# Patient Record
Sex: FEMALE | Race: WHITE | Employment: STUDENT | URBAN - METROPOLITAN AREA
[De-identification: names, ages, dates, MRNs, and addresses within clinical notes are randomized per-mention and may not be internally consistent; named-entity substitution may affect disease eponyms.]

---

## 2017-05-09 ENCOUNTER — OFFICE VISIT (OUTPATIENT)
Dept: FAMILY MEDICINE CLINIC | Age: 22
End: 2017-05-09

## 2017-05-09 VITALS
BODY MASS INDEX: 38.57 KG/M2 | SYSTOLIC BLOOD PRESSURE: 108 MMHG | HEART RATE: 72 BPM | TEMPERATURE: 98.1 F | DIASTOLIC BLOOD PRESSURE: 66 MMHG | RESPIRATION RATE: 16 BRPM | HEIGHT: 66 IN | WEIGHT: 240 LBS

## 2017-05-09 DIAGNOSIS — E16.1 HYPERINSULINEMIA: ICD-10-CM

## 2017-05-09 DIAGNOSIS — N92.6 IRREGULAR MENSTRUAL CYCLE: ICD-10-CM

## 2017-05-09 DIAGNOSIS — E28.2 PCOD (POLYCYSTIC OVARIAN DISEASE): ICD-10-CM

## 2017-05-09 DIAGNOSIS — N92.6 IRREGULAR MENSTRUAL CYCLE: Primary | ICD-10-CM

## 2017-05-09 LAB
ANION GAP SERPL CALCULATED.3IONS-SCNC: 17 MEQ/L (ref 7–13)
BUN BLDV-MCNC: 11 MG/DL (ref 6–20)
CALCIUM SERPL-MCNC: 9.4 MG/DL (ref 8.6–10.2)
CHLORIDE BLD-SCNC: 103 MEQ/L (ref 98–107)
CO2: 21 MEQ/L (ref 22–29)
CONTROL: NORMAL
CREAT SERPL-MCNC: 0.55 MG/DL (ref 0.5–0.9)
FOLLICLE STIMULATING HORMONE: 5.2 MIU/ML
GFR AFRICAN AMERICAN: >60
GFR NON-AFRICAN AMERICAN: >60
GLUCOSE BLD-MCNC: 93 MG/DL (ref 74–109)
GONADOTROPIN, CHORIONIC (HCG) QUANT: <0.1 MIU/ML
INSULIN: 40.1 UIU/ML (ref 2.6–24.9)
LUTEINIZING HORMONE: 10.7 MIU/ML
POTASSIUM SERPL-SCNC: 4.3 MEQ/L (ref 3.5–5.1)
PREGNANCY TEST URINE, POC: NORMAL
SODIUM BLD-SCNC: 141 MEQ/L (ref 132–144)
T4 FREE: 0.91 NG/DL (ref 0.93–1.7)
TSH SERPL DL<=0.05 MIU/L-ACNC: 1.87 UIU/ML (ref 0.27–4.2)

## 2017-05-09 PROCEDURE — 1036F TOBACCO NON-USER: CPT | Performed by: FAMILY MEDICINE

## 2017-05-09 PROCEDURE — 81025 URINE PREGNANCY TEST: CPT | Performed by: FAMILY MEDICINE

## 2017-05-09 PROCEDURE — G8427 DOCREV CUR MEDS BY ELIG CLIN: HCPCS | Performed by: FAMILY MEDICINE

## 2017-05-09 PROCEDURE — 99214 OFFICE O/P EST MOD 30 MIN: CPT | Performed by: FAMILY MEDICINE

## 2017-05-09 PROCEDURE — G8419 CALC BMI OUT NRM PARAM NOF/U: HCPCS | Performed by: FAMILY MEDICINE

## 2017-05-09 RX ORDER — DESOGESTREL AND ETHINYL ESTRADIOL 0.15-0.03
1 KIT ORAL DAILY
Qty: 1 PACKET | Refills: 3 | Status: SHIPPED | OUTPATIENT
Start: 2017-05-09

## 2017-05-10 RX ORDER — LEVOTHYROXINE SODIUM 0.03 MG/1
25 TABLET ORAL DAILY
Qty: 30 TABLET | Refills: 3 | Status: SHIPPED | OUTPATIENT
Start: 2017-05-10

## 2017-05-11 LAB — 17-OH PROGESTERONE LCMS: 111 NG/DL

## 2017-07-24 DIAGNOSIS — Z30.09 GENERAL COUNSELLING AND ADVICE ON CONTRACEPTION: Primary | ICD-10-CM

## 2019-05-10 ENCOUNTER — TELEPHONE (OUTPATIENT)
Dept: FAMILY MEDICINE CLINIC | Age: 24
End: 2019-05-10

## 2023-10-18 PROBLEM — F41.9 ANXIETY: Status: ACTIVE | Noted: 2023-10-18

## 2023-10-18 PROBLEM — E16.1 HYPERINSULINEMIA: Status: ACTIVE | Noted: 2023-10-18

## 2023-10-23 ENCOUNTER — LAB (OUTPATIENT)
Dept: LAB | Facility: LAB | Age: 28
End: 2023-10-23
Payer: COMMERCIAL

## 2023-10-23 ENCOUNTER — OFFICE VISIT (OUTPATIENT)
Dept: PRIMARY CARE | Facility: CLINIC | Age: 28
End: 2023-10-23
Payer: COMMERCIAL

## 2023-10-23 VITALS
HEIGHT: 66 IN | DIASTOLIC BLOOD PRESSURE: 82 MMHG | SYSTOLIC BLOOD PRESSURE: 100 MMHG | BODY MASS INDEX: 39.95 KG/M2 | TEMPERATURE: 97.1 F | HEART RATE: 68 BPM | OXYGEN SATURATION: 98 % | WEIGHT: 248.6 LBS | RESPIRATION RATE: 16 BRPM

## 2023-10-23 DIAGNOSIS — F41.9 ANXIETY: ICD-10-CM

## 2023-10-23 DIAGNOSIS — Z23 NEED FOR TDAP VACCINATION: ICD-10-CM

## 2023-10-23 DIAGNOSIS — Z00.00 ANNUAL PHYSICAL EXAM: ICD-10-CM

## 2023-10-23 DIAGNOSIS — R00.2 PALPITATIONS: Primary | ICD-10-CM

## 2023-10-23 DIAGNOSIS — Z23 NEED FOR VACCINATION: ICD-10-CM

## 2023-10-23 DIAGNOSIS — R00.0 TACHYCARDIA: ICD-10-CM

## 2023-10-23 LAB
ALBUMIN SERPL BCP-MCNC: 4.5 G/DL (ref 3.4–5)
ALP SERPL-CCNC: 44 U/L (ref 33–110)
ALT SERPL W P-5'-P-CCNC: 26 U/L (ref 7–45)
ANION GAP SERPL CALC-SCNC: 13 MMOL/L (ref 10–20)
AST SERPL W P-5'-P-CCNC: 12 U/L (ref 9–39)
BILIRUB SERPL-MCNC: 0.7 MG/DL (ref 0–1.2)
BUN SERPL-MCNC: 13 MG/DL (ref 6–23)
CALCIUM SERPL-MCNC: 9.6 MG/DL (ref 8.6–10.3)
CHLORIDE SERPL-SCNC: 108 MMOL/L (ref 98–107)
CHOLEST SERPL-MCNC: 191 MG/DL (ref 0–199)
CHOLESTEROL/HDL RATIO: 6.4
CO2 SERPL-SCNC: 21 MMOL/L (ref 21–32)
CREAT SERPL-MCNC: 0.75 MG/DL (ref 0.5–1.05)
ERYTHROCYTE [DISTWIDTH] IN BLOOD BY AUTOMATED COUNT: 12.5 % (ref 11.5–14.5)
EST. AVERAGE GLUCOSE BLD GHB EST-MCNC: 105 MG/DL
GFR SERPL CREATININE-BSD FRML MDRD: >90 ML/MIN/1.73M*2
GLUCOSE SERPL-MCNC: 90 MG/DL (ref 74–99)
HBA1C MFR BLD: 5.3 %
HCT VFR BLD AUTO: 41.6 % (ref 36–46)
HDLC SERPL-MCNC: 29.9 MG/DL
HGB BLD-MCNC: 13.9 G/DL (ref 12–16)
LDLC SERPL CALC-MCNC: 115 MG/DL
MCH RBC QN AUTO: 29.2 PG (ref 26–34)
MCHC RBC AUTO-ENTMCNC: 33.4 G/DL (ref 32–36)
MCV RBC AUTO: 87 FL (ref 80–100)
NON HDL CHOLESTEROL: 161 MG/DL (ref 0–149)
NRBC BLD-RTO: 0 /100 WBCS (ref 0–0)
PLATELET # BLD AUTO: 211 X10*3/UL (ref 150–450)
PMV BLD AUTO: 12 FL (ref 7.5–11.5)
POTASSIUM SERPL-SCNC: 4.1 MMOL/L (ref 3.5–5.3)
PROT SERPL-MCNC: 7.2 G/DL (ref 6.4–8.2)
RBC # BLD AUTO: 4.76 X10*6/UL (ref 4–5.2)
SODIUM SERPL-SCNC: 138 MMOL/L (ref 136–145)
TRIGL SERPL-MCNC: 230 MG/DL (ref 0–149)
TSH SERPL-ACNC: 1.27 MIU/L (ref 0.44–3.98)
VLDL: 46 MG/DL (ref 0–40)
WBC # BLD AUTO: 7 X10*3/UL (ref 4.4–11.3)

## 2023-10-23 PROCEDURE — 84443 ASSAY THYROID STIM HORMONE: CPT

## 2023-10-23 PROCEDURE — 91320 SARSCV2 VAC 30MCG TRS-SUC IM: CPT | Performed by: PHYSICIAN ASSISTANT

## 2023-10-23 PROCEDURE — 90715 TDAP VACCINE 7 YRS/> IM: CPT | Performed by: PHYSICIAN ASSISTANT

## 2023-10-23 PROCEDURE — 90480 ADMN SARSCOV2 VAC 1/ONLY CMP: CPT | Performed by: PHYSICIAN ASSISTANT

## 2023-10-23 PROCEDURE — 99395 PREV VISIT EST AGE 18-39: CPT | Performed by: PHYSICIAN ASSISTANT

## 2023-10-23 PROCEDURE — 1036F TOBACCO NON-USER: CPT | Performed by: PHYSICIAN ASSISTANT

## 2023-10-23 PROCEDURE — 85027 COMPLETE CBC AUTOMATED: CPT

## 2023-10-23 PROCEDURE — 80053 COMPREHEN METABOLIC PANEL: CPT

## 2023-10-23 PROCEDURE — 36415 COLL VENOUS BLD VENIPUNCTURE: CPT

## 2023-10-23 PROCEDURE — 80061 LIPID PANEL: CPT

## 2023-10-23 PROCEDURE — 83036 HEMOGLOBIN GLYCOSYLATED A1C: CPT

## 2023-10-23 PROCEDURE — 90471 IMMUNIZATION ADMIN: CPT | Performed by: PHYSICIAN ASSISTANT

## 2023-10-23 RX ORDER — SERTRALINE HYDROCHLORIDE 25 MG/1
25 TABLET, FILM COATED ORAL DAILY
Qty: 30 TABLET | Refills: 1 | Status: SHIPPED | OUTPATIENT
Start: 2023-10-23 | End: 2023-12-22

## 2023-10-23 ASSESSMENT — ENCOUNTER SYMPTOMS
OCCASIONAL FEELINGS OF UNSTEADINESS: 0
NERVOUS/ANXIOUS: 1
ARTHRALGIAS: 0
ABDOMINAL PAIN: 0
LOSS OF SENSATION IN FEET: 0
EYE PAIN: 0
SHORTNESS OF BREATH: 0
CONSTIPATION: 0
NAUSEA: 0
MYALGIAS: 0
DIARRHEA: 0
VOMITING: 0

## 2023-10-23 ASSESSMENT — ANXIETY QUESTIONNAIRES
1. FEELING NERVOUS, ANXIOUS, OR ON EDGE: MORE THAN HALF THE DAYS
4. TROUBLE RELAXING: SEVERAL DAYS
5. BEING SO RESTLESS THAT IT IS HARD TO SIT STILL: NOT AT ALL
7. FEELING AFRAID AS IF SOMETHING AWFUL MIGHT HAPPEN: SEVERAL DAYS
GAD7 TOTAL SCORE: 8
IF YOU CHECKED OFF ANY PROBLEMS ON THIS QUESTIONNAIRE, HOW DIFFICULT HAVE THESE PROBLEMS MADE IT FOR YOU TO DO YOUR WORK, TAKE CARE OF THINGS AT HOME, OR GET ALONG WITH OTHER PEOPLE: SOMEWHAT DIFFICULT
6. BECOMING EASILY ANNOYED OR IRRITABLE: SEVERAL DAYS
2. NOT BEING ABLE TO STOP OR CONTROL WORRYING: MORE THAN HALF THE DAYS
3. WORRYING TOO MUCH ABOUT DIFFERENT THINGS: SEVERAL DAYS

## 2023-10-23 ASSESSMENT — PATIENT HEALTH QUESTIONNAIRE - PHQ9
1. LITTLE INTEREST OR PLEASURE IN DOING THINGS: NOT AT ALL
SUM OF ALL RESPONSES TO PHQ9 QUESTIONS 1 AND 2: 0
2. FEELING DOWN, DEPRESSED OR HOPELESS: NOT AT ALL

## 2023-10-23 NOTE — PROGRESS NOTES
Subjective   Patient ID: Fermin Clark is a 27 y.o. female who presents for New Patient Visit (High blood sugar/ prediabetic. /Pt wants to just check her levels. ) and Anxiety (Pt states she doesn't know if she has any anxiety or heart problems. In certain situations pt's heart is beating too fast that she needs to take deep breaths so she doesn't get anxious. ).    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 Sees them for Nexplanon   - Flu: DUE, DECLINED  - Td: DUE, ordered   - COVID-19 vax: scheduled to get it   - Diet: sweet tooth, skips breakfast, lunch - turkey wrap, chicken salad croissant, gets food at [employer], dinner - pork chops, chicken, not many veggies, not much fruit. Drinks a lot of diet pepsi, more water lately.   - Exercise: active with the horses and lots of stairs at work   - Sexual hx: no   - Tobacco: no   - Illicit drugs: no   - Alcohol: no      Prediabetes:  - had labs in the past and was told she's prediabetic  - No diabetes in the family     Tachycardia/ Anxiety:  - Feels like palpitations   - When going over a hill and stomach drops out feels that in chest   - Noticed back in end of August   - Triggered by emotional stress (when things aren't going well at work)   - Doesn't feel faint though or falling   - The feeling will be quick and brief - seconds long. Will happen repeatedly   - Happens on weekly basis lately   - Has dealt with anxiety for a while   - Coping strategies - distraction   - Has never talked with counselor or therapist   - Has tried Fluoxetine - it didn't work well with the anxiety and started to feel depressed on it     Review of Systems   Eyes:  Negative for pain and visual disturbance.   Respiratory:  Negative for shortness of breath.   "  Cardiovascular:  Negative for chest pain.   Gastrointestinal:  Negative for abdominal pain, constipation, diarrhea, nausea and vomiting.   Musculoskeletal:  Negative for arthralgias and myalgias.   Skin:  Negative for rash.   Psychiatric/Behavioral:  The patient is nervous/anxious.        Objective   /82 (BP Location: Left arm, Patient Position: Sitting, BP Cuff Size: Large adult)   Pulse 68   Temp 36.2 °C (97.1 °F) (Temporal)   Resp 16   Ht 1.676 m (5' 6\")   Wt 113 kg (248 lb 9.6 oz)   SpO2 98%   BMI 40.13 kg/m²     Physical Exam  Constitutional:       General: She is not in acute distress.     Appearance: She is obese.      Comments: Pleasant  female  Brown, shoulder length hair   Nervous and fidgety today   HENT:      Head: Normocephalic.      Right Ear: Tympanic membrane and ear canal normal.      Left Ear: Tympanic membrane and ear canal normal.      Nose: Nose normal.      Mouth/Throat:      Mouth: Mucous membranes are moist.      Pharynx: Oropharynx is clear.   Eyes:      Extraocular Movements: Extraocular movements intact.      Conjunctiva/sclera: Conjunctivae normal.      Pupils: Pupils are equal, round, and reactive to light.   Cardiovascular:      Rate and Rhythm: Normal rate and regular rhythm.      Pulses: Normal pulses.      Heart sounds: No murmur heard.  Pulmonary:      Effort: Pulmonary effort is normal.      Breath sounds: Normal breath sounds. No wheezing, rhonchi or rales.   Abdominal:      General: Bowel sounds are normal. There is no distension.      Palpations: Abdomen is soft. There is no mass.      Tenderness: There is no abdominal tenderness. There is no guarding.   Musculoskeletal:         General: Normal range of motion.      Cervical back: Neck supple.   Lymphadenopathy:      Cervical: No cervical adenopathy.   Skin:     General: Skin is warm and dry.      Findings: No lesion or rash.   Neurological:      General: No focal deficit present.      Mental Status: She " is alert.      Gait: Gait normal.   Psychiatric:         Attention and Perception: Attention normal.         Mood and Affect: Affect normal. Mood is anxious.         Speech: Speech normal.         Behavior: Behavior normal. Behavior is cooperative.         Cognition and Memory: Cognition normal.         Judgment: Judgment normal.         Assessment/Plan   Problem List Items Addressed This Visit             ICD-10-CM    Anxiety F41.9     - New diagnosis  - I educated the patient about the dx.   - I reviewed with the patient the options for treatment of anxiety  including medication options, counseling and a combination of both. I explained that the combination of both is more effective than either one alone.   - Will rx Zoloft 25 mg   - I explained the mechanism of the medication, possible side effects and expected time frame for response to treatment.   - Recommended patient see a psychologist or counselor for therapy.   - Advised coping strategies such as deep breathing, meditation, exercise, and nurturing meaningful relationships.  - F/u 1 month to assess response to tx   - All the patient's questions were answered. Pt expressed understanding and agreed to the plan.          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 Increase healthy alternatives, whole grains, fruits vegetables  - Discussed EXERCISE - Recommended weight training for bone health and 30 minutes of cardiovascular exercise 5-7 days a week.  - Avoid cigarette smoking.   - Limit  alcohol consumption.          Relevant Orders    CBC    Comprehensive Metabolic Panel    Hemoglobin A1C    Lipid Panel    TSH with reflex to Free T4 if abnormal     Other Visit Diagnoses         Codes    Palpitations    -  Primary R00.2    Tachycardia     R00.0    Need for Tdap vaccination     Z23    Relevant Orders    Tdap vaccine, age 7 years and older (Completed)    Need for vaccination     Z23    Relevant Orders    Pfizer COVID-19 vaccine, [id number], monovalent, age 12 years and older (30 mcg/0.3 mL)

## 2023-10-23 NOTE — ASSESSMENT & PLAN NOTE
- New diagnosis  - I educated the patient about the dx.   - I reviewed with the patient the options for treatment of anxiety  including medication options, counseling and a combination of both. I explained that the combination of both is more effective than either one alone.   - Will rx Zoloft 25 mg   - Has tried Prozac in the past with poor response (felt more depressed)   - I explained the mechanism of the medication, possible side effects and expected time frame for response to treatment.   - Recommended patient see a psychologist or counselor for therapy.   - Advised coping strategies such as deep breathing, meditation, exercise, and nurturing meaningful relationships.  - F/u 1 month to assess response to tx   - All the patient's questions were answered. Pt expressed understanding and agreed to the plan.

## 2023-10-23 NOTE — ASSESSMENT & PLAN NOTE
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 Increase healthy alternatives, whole grains, fruits vegetables  - Discussed EXERCISE - Recommended weight training for bone health and 30 minutes of cardiovascular exercise 5-7 days a week.  - Avoid cigarette smoking.   - Limit alcohol consumption.    GERD (gastroesophageal reflux disease)

## 2024-08-06 ENCOUNTER — APPOINTMENT (OUTPATIENT)
Dept: PRIMARY CARE | Facility: CLINIC | Age: 29
End: 2024-08-06
Payer: COMMERCIAL

## 2024-08-06 VITALS
SYSTOLIC BLOOD PRESSURE: 128 MMHG | HEART RATE: 66 BPM | WEIGHT: 261 LBS | TEMPERATURE: 96.9 F | OXYGEN SATURATION: 98 % | DIASTOLIC BLOOD PRESSURE: 86 MMHG | BODY MASS INDEX: 41.95 KG/M2 | HEIGHT: 66 IN | RESPIRATION RATE: 18 BRPM

## 2024-08-06 DIAGNOSIS — E66.01 CLASS 3 SEVERE OBESITY DUE TO EXCESS CALORIES WITHOUT SERIOUS COMORBIDITY WITH BODY MASS INDEX (BMI) OF 40.0 TO 44.9 IN ADULT (MULTI): ICD-10-CM

## 2024-08-06 DIAGNOSIS — L68.0 FEMALE HIRSUTISM: ICD-10-CM

## 2024-08-06 DIAGNOSIS — E28.2 PCOS (POLYCYSTIC OVARIAN SYNDROME): Primary | ICD-10-CM

## 2024-08-06 PROBLEM — E66.813 CLASS 3 SEVERE OBESITY DUE TO EXCESS CALORIES WITHOUT SERIOUS COMORBIDITY WITH BODY MASS INDEX (BMI) OF 40.0 TO 44.9 IN ADULT: Status: ACTIVE | Noted: 2024-08-06

## 2024-08-06 PROCEDURE — 3008F BODY MASS INDEX DOCD: CPT | Performed by: PHYSICIAN ASSISTANT

## 2024-08-06 PROCEDURE — 99213 OFFICE O/P EST LOW 20 MIN: CPT | Performed by: PHYSICIAN ASSISTANT

## 2024-08-06 RX ORDER — SEMAGLUTIDE 0.25 MG/.5ML
0.25 INJECTION, SOLUTION SUBCUTANEOUS
Qty: 2 ML | Refills: 0 | Status: SHIPPED | OUTPATIENT
Start: 2024-08-11 | End: 2024-08-07 | Stop reason: SDUPTHER

## 2024-08-06 RX ORDER — SPIRONOLACTONE 50 MG/1
50 TABLET, FILM COATED ORAL 2 TIMES DAILY
Qty: 180 TABLET | Refills: 1 | Status: SHIPPED | OUTPATIENT
Start: 2024-08-06 | End: 2025-02-02

## 2024-08-06 ASSESSMENT — ENCOUNTER SYMPTOMS
DIARRHEA: 0
CONSTITUTIONAL NEGATIVE: 1
SHORTNESS OF BREATH: 0
NAUSEA: 0
COLOR CHANGE: 1
ABDOMINAL PAIN: 0
CONSTIPATION: 0

## 2024-08-06 NOTE — PROGRESS NOTES
"Subjective   Patient ID: Fermin Clark is a 28 y.o. female who presents for Follow-up (Pt here today for a follow up to discuss PCOS; about 8 years ago got diagnosed and was put on birth control for it but the birth control isnt helping the discoloration of skin and facial hair. Started on the pill but has had the Nexplanon for 6-7 years now and GYN-Dr Carson put it in. ).    HPI     PCOS:  - Saw OB/GYN 9/12/23 and had Nexplanon removed and reinserted   - Was on OCP prior to that   - Periods:   - Facial hair growth -   - Skin discoloration -   - Historical treatments -     Review of Systems    Objective   /86   Pulse 66   Temp 36.1 °C (96.9 °F)   Resp 18   Ht 1.676 m (5' 6\")   Wt 118 kg (261 lb)   SpO2 98%   BMI 42.13 kg/m²     Physical Exam    Assessment/Plan     Problem List Items Addressed This Visit    None      "

## 2024-08-06 NOTE — PROGRESS NOTES
"Subjective   Patient ID: Fermin Clark is a 28 y.o. female who presents for Follow-up (Pt here today for a follow up to discuss PCOS; about 8 years ago got diagnosed and was put on birth control for it but the birth control isnt helping the discoloration of skin and facial hair. Started on the pill but has had the Nexplanon for 6-7 years now and GYN-Dr Carson put it in. ).    HPI     PCOS:  - Saw OB/GYN 9/12/23 and had Nexplanon removed and reinserted   - Was on OCP prior to that   - Periods: Does not get a period while on the Nexplanon  - Facial hair growth - Has been going for years but has researched things to helped and is interested in Spironolactone  - Skin discoloration - In intriginous folds throughout body. However no changes from prior.   -Weight management- Has gained 10 lbs since last year and is interested in Semaglutide  - Historical treatments -OCC, Nexplanon   - Diet: eats fast food 2-3 times a week, chicken salad croissants, sandwhiches, casseroles for dinner. No breaksfast. Snacks on pistachios and peanut butter. Some sweets 2-3 times a week. Drinks water and diet Pepsi.   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    Respiratory:  Negative for shortness of breath.    Cardiovascular:  Negative for chest pain.   Gastrointestinal:  Negative for abdominal pain, constipation, diarrhea and nausea.   Skin:  Positive for color change (skin discoloration at neck, behind the knee and in the axilla).       Objective   /86   Pulse 66   Temp 36.1 °C (96.9 °F)   Resp 18   Ht 1.676 m (5' 6\")   Wt 118 kg (261 lb)   SpO2 98%   BMI 42.13 kg/m²     Physical Exam  Constitutional:       Appearance: Normal appearance. She is obese.   Cardiovascular:      Rate and Rhythm: Normal rate and regular rhythm.      Pulses: Normal pulses.      Heart sounds: Normal heart sounds. No murmur heard.  Pulmonary: "      Effort: Pulmonary effort is normal.      Breath sounds: Normal breath sounds.   Abdominal:      General: Abdomen is flat. Bowel sounds are normal. There is no distension.      Palpations: Abdomen is soft. There is no mass.      Tenderness: There is no abdominal tenderness.      Hernia: No hernia is present.   Musculoskeletal:      Cervical back: Normal range of motion and neck supple.   Skin:     General: Skin is warm and dry.      Coloration: Skin is ashen (in intriginous folds).   Neurological:      Mental Status: She is alert and oriented to person, place, and time. Mental status is at baseline.   Psychiatric:         Mood and Affect: Mood normal.         Behavior: Behavior normal.         Thought Content: Thought content normal.       Assessment/Plan     Problem List Items Addressed This Visit       PCOS (polycystic ovarian syndrome) - Primary    Overview     - Dx'd 2016  - Tried OCP (ineffective)   - Current med: Nexplanon (sees GYN)          Current Assessment & Plan     Pt is currently on Nexplanon for PCOS. Pt has done research on her own and is interested in starting Spironolactone to reduce hirtuism. Pt educated on risks and benefits of starting this medication including hyperkalemia and the diurteic effect. She feels that benefits outweighs the risks at this time. Rx Spironolactone 50mg BID. Check BMP in 2 weeks. All questions answered-pt agreeable to plan.         Relevant Medications    spironolactone (Aldactone) 50 mg tablet    Other Relevant Orders    Basic Metabolic Panel    Class 3 severe obesity due to excess calories without serious comorbidity with body mass index (BMI) of 40.0 to 44.9 in adult (Multi)    Overview     - 8/6/24 - BMI = 42.13 kg/m2, Wt = 261 lbs. Start Wegovy.         Current Assessment & Plan     - Current weight: 118 kg (261 lb)  - Weight change since last visit (-) denotes wt loss: 12.4 lbs   - Weight loss needed to achieve BMI 25: 106.4 Lbs  - Weight loss needed to achieve  BMI 30: 75.5 Lbs  - I discussed the health benefits of lower body weight and the risks associated with obesity.   - Pt advised to increase physical activity with a goal of 30 minutes of cardiovascular/ aerobic exercise daily.   - Pt advised to improve the diet - less fatty foods, red meat and sweets,  more fruits, vegetables, fish, and whole grains.   - Will place referral to dietician for further evaluation and tx.   - All the pt's questions were answered. The pt expressed understanding and agreed to the plan.          Relevant Medications    semaglutide, weight loss, (Wegovy) 0.25 mg/0.5 mL pen injector (Start on 8/11/2024)    Other Relevant Orders    Referral to Nutrition Services    Basic Metabolic Panel     Other Visit Diagnoses       Female hirsutism        Relevant Medications    spironolactone (Aldactone) 50 mg tablet    Other Relevant Orders    Basic Metabolic Panel            Daphne Cervantes, PA-S2    I was present with the PA student who participated in the documentation of this note. I have personally seen and re-examined the patient and performed the medical decision-making components (assessment and plan of care). I have reviewed the PA student documentation and verified the findings in the note as written with additions or exceptions as stated in the body of this note.    ROMEO Torrez-C

## 2024-08-06 NOTE — ASSESSMENT & PLAN NOTE
Pt is currently on Nexplanon for PCOS. Pt has done research on her own and is interested in starting Spironolactone to reduce hirtuism. Pt educated on risks and benefits of starting this medication including hyperkalemia and the diurteic effect. She feels that benefits outweighs the risks at this time. Rx Spironolactone 50mg BID. Check BMP in 2 weeks. All questions answered-pt agreeable to plan.

## 2024-08-06 NOTE — ASSESSMENT & PLAN NOTE
- Current weight: 118 kg (261 lb)  - Weight change since last visit (-) denotes wt loss: 12.4 lbs   - Weight loss needed to achieve BMI 25: 106.4 Lbs  - Weight loss needed to achieve BMI 30: 75.5 Lbs  - I discussed the health benefits of lower body weight and the risks associated with obesity.   - Pt advised to increase physical activity with a goal of 30 minutes of cardiovascular/ aerobic exercise daily.   - Pt advised to improve the diet - less fatty foods, red meat and sweets,  more fruits, vegetables, fish, and whole grains.   - Will place referral to dietician for further evaluation and tx.   - All the pt's questions were answered. The pt expressed understanding and agreed to the plan.

## 2024-08-07 DIAGNOSIS — E66.01 CLASS 3 SEVERE OBESITY DUE TO EXCESS CALORIES WITHOUT SERIOUS COMORBIDITY WITH BODY MASS INDEX (BMI) OF 40.0 TO 44.9 IN ADULT (MULTI): ICD-10-CM

## 2024-08-07 RX ORDER — SEMAGLUTIDE 0.25 MG/.5ML
0.25 INJECTION, SOLUTION SUBCUTANEOUS
Qty: 2 ML | Refills: 0 | Status: SHIPPED | OUTPATIENT
Start: 2024-08-11 | End: 2024-09-10

## 2024-08-12 DIAGNOSIS — E66.01 CLASS 3 SEVERE OBESITY DUE TO EXCESS CALORIES WITHOUT SERIOUS COMORBIDITY WITH BODY MASS INDEX (BMI) OF 40.0 TO 44.9 IN ADULT (MULTI): Primary | ICD-10-CM

## 2024-08-12 RX ORDER — NALTREXONE HYDROCHLORIDE AND BUPROPION HYDROCHLORIDE 8; 90 MG/1; MG/1
TABLET, EXTENDED RELEASE ORAL
Qty: 70 TABLET | Refills: 0 | Status: SHIPPED | OUTPATIENT
Start: 2024-08-12

## 2024-08-13 ENCOUNTER — TELEPHONE (OUTPATIENT)
Dept: PHARMACY | Facility: HOSPITAL | Age: 29
End: 2024-08-13
Payer: COMMERCIAL

## 2024-08-13 NOTE — TELEPHONE ENCOUNTER
Spoke with patient today regarding weight loss options. Given PA denial for Wegovy, will plan to transition patient to Contrave through [facility]. Counseled patient on MOA, expectations, side effects, duration of therapy, contraindications, administration, and monitoring parameters. Will plan to have patient schedule 3 month follow-up with PCP for weight check. Answered all patient questions and concerns; provided [facility] phone number if issues/questions arise.    Thank you,  Jyoti Calero, PharmD

## 2024-09-06 DIAGNOSIS — E66.01 CLASS 3 SEVERE OBESITY DUE TO EXCESS CALORIES WITHOUT SERIOUS COMORBIDITY WITH BODY MASS INDEX (BMI) OF 40.0 TO 44.9 IN ADULT (MULTI): ICD-10-CM

## 2024-09-06 RX ORDER — NALTREXONE HYDROCHLORIDE AND BUPROPION HYDROCHLORIDE 8; 90 MG/1; MG/1
2 TABLET, EXTENDED RELEASE ORAL 2 TIMES DAILY
Qty: 360 TABLET | Refills: 0 | Status: SHIPPED | OUTPATIENT
Start: 2024-09-06

## 2024-11-16 ENCOUNTER — OFFICE VISIT (OUTPATIENT)
Dept: URGENT CARE | Age: 29
End: 2024-11-16
Payer: COMMERCIAL

## 2024-11-16 ENCOUNTER — ANCILLARY PROCEDURE (OUTPATIENT)
Dept: URGENT CARE | Age: 29
End: 2024-11-16
Payer: COMMERCIAL

## 2024-11-16 VITALS
HEIGHT: 66 IN | RESPIRATION RATE: 20 BRPM | SYSTOLIC BLOOD PRESSURE: 130 MMHG | TEMPERATURE: 98.1 F | HEART RATE: 69 BPM | BODY MASS INDEX: 41.78 KG/M2 | OXYGEN SATURATION: 98 % | WEIGHT: 260 LBS | DIASTOLIC BLOOD PRESSURE: 83 MMHG

## 2024-11-16 DIAGNOSIS — M79.672 LEFT FOOT PAIN: ICD-10-CM

## 2024-11-16 PROCEDURE — 99203 OFFICE O/P NEW LOW 30 MIN: CPT | Performed by: NURSE PRACTITIONER

## 2024-11-16 PROCEDURE — 73630 X-RAY EXAM OF FOOT: CPT | Mod: LEFT SIDE | Performed by: NURSE PRACTITIONER

## 2024-11-16 PROCEDURE — 3008F BODY MASS INDEX DOCD: CPT | Performed by: NURSE PRACTITIONER

## 2024-11-16 RX ORDER — METHYLPREDNISOLONE 4 MG/1
TABLET ORAL
Qty: 21 TABLET | Refills: 0 | Status: SHIPPED | OUTPATIENT
Start: 2024-11-16 | End: 2024-11-23

## 2024-11-16 RX ORDER — IBUPROFEN 600 MG/1
600 TABLET ORAL EVERY 6 HOURS PRN
Qty: 15 TABLET | Refills: 0 | Status: SHIPPED | OUTPATIENT
Start: 2024-11-16 | End: 2024-11-21

## 2024-11-16 ASSESSMENT — PAIN SCALES - GENERAL: PAINLEVEL_OUTOF10: 4

## 2024-11-16 NOTE — PROGRESS NOTES
"Subjective   Patient ID: Fermin Clark is a 29 y.o. female. They present today with a chief complaint of Foot Pain (Issue started on 11/14/24. Pain at ball of foot at 1st-3rd digit. No injury. Burning type pain. No known hx of gout.).    History of Present Illness  HPI  Pt presents to urgent care with c/o R foot pain, no known injuries.  Denies numbness, tingling.  Pt denies CP, SOB, palpitations, fevers, abd pain, n/v/d, sick contacts, recent travel.        Past Medical History  Allergies as of 11/16/2024 - Reviewed 11/16/2024   Allergen Reaction Noted    Penicillins Hives 10/23/2023       (Not in a hospital admission)       No past medical history on file.    No past surgical history on file.     reports that she has never smoked. She has never used smokeless tobacco. She reports current alcohol use. She reports that she does not use drugs.    Review of Systems  Review of Systems   Constitutional: Negative.    HENT: Negative.     Eyes: Negative.    Respiratory: Negative.     Cardiovascular:  Negative for chest pain and palpitations.   Gastrointestinal: Negative.    Endocrine: Negative.    Genitourinary: Negative.    Musculoskeletal: Negative.    Skin: Negative.    Allergic/Immunologic: Negative.    Neurological: Negative.    Hematological: Negative.    Psychiatric/Behavioral: Negative.     All other systems reviewed and are negative.                                 Objective    Vitals:    11/16/24 1039   BP: 130/83   Pulse: 69   Resp: 20   Temp: 36.7 °C (98.1 °F)   TempSrc: Oral   SpO2: 98%   Weight: 118 kg (260 lb)   Height: 1.676 m (5' 6\")     No LMP recorded (lmp unknown). (Menstrual status: IUD).    Physical Exam  Vitals and nursing note reviewed.   Constitutional:       General: She is not in acute distress.     Appearance: Normal appearance. She is not ill-appearing or toxic-appearing.   HENT:      Head: Atraumatic.      Right Ear: Tympanic membrane, ear canal and external ear normal.      Left Ear: Tympanic " membrane, ear canal and external ear normal.      Nose: Nose normal.      Mouth/Throat:      Mouth: Mucous membranes are moist.      Pharynx: Oropharynx is clear. No oropharyngeal exudate or posterior oropharyngeal erythema.   Eyes:      Extraocular Movements: Extraocular movements intact.      Conjunctiva/sclera: Conjunctivae normal.      Pupils: Pupils are equal, round, and reactive to light.   Cardiovascular:      Rate and Rhythm: Normal rate and regular rhythm.      Pulses: Normal pulses.      Heart sounds: Normal heart sounds.   Pulmonary:      Effort: Pulmonary effort is normal.      Breath sounds: Normal breath sounds.   Abdominal:      General: Abdomen is flat. Bowel sounds are normal.      Palpations: Abdomen is soft.      Tenderness: There is no abdominal tenderness.   Musculoskeletal:         General: Normal range of motion.      Cervical back: Normal range of motion and neck supple. No tenderness.   Skin:     General: Skin is warm and dry.      Capillary Refill: Capillary refill takes less than 2 seconds.   Neurological:      General: No focal deficit present.      Mental Status: She is alert and oriented to person, place, and time.   Psychiatric:         Mood and Affect: Mood normal.         Behavior: Behavior normal.         Thought Content: Thought content normal.         Procedures    Point of Care Test & Imaging Results from this visit  No results found for this visit on 11/16/24.   XR foot left 3+ views    Result Date: 11/16/2024  Interpreted By:  Nayeli Mueller, STUDY: XR FOOT LEFT 3+ VIEWS; ;  11/16/2024 10:59 am   INDICATION: Signs/Symptoms:Rule out injury to Lt foot due to unknown pain source at 1-3 digit.   COMPARISON: None.   ACCESSION NUMBER(S): [id number]   ORDERING CLINICIAN: ZANDRA BOB   FINDINGS: Three views of left foot were performed.   There is no acute fracture or dislocation. Joint articulations are maintained. Note made of prominent os peroneus. There is prominent plantar  calcaneal enthesophyte. No obvious radiographic evidence of soft tissue abnormality.       Prominent plantar calcaneal enthesophyte. Otherwise no acute fracture or dislocation.     Signed by: Nayeli Mueller 11/16/2024 11:17 AM Dictation workstation:   [id number]     Diagnostic study results (if any) were reviewed by PIA Liu.    Assessment/Plan   Allergies, medications, history, and pertinent labs/EKGs/Imaging reviewed by PIA Liu.     Medical Decision Making  Pt presents with R foot pain x 3 days, denies injuries.  Toes are warm to touch with brisk cap refill.  Intact pulses.  ROM intact.   Pt states pain is located at bottom of foot and goes across her 1st-3rd toes.  Denies numbness, tingling.  Xrays of foot negative for acute abnormality per rad report.  Suspect msk pain, strain as cause of pt's symptoms.  At time of discharge patient was clinically well-appearing and HDS for outpatient management. The patient was educated regarding diagnosis, supportive care, OTC and Rx medications. The patient was given the opportunity to ask questions prior to discharge.  They verbalized understanding of my discussion of the plans for treatment, expected course, indications to return to UC or seek further evaluation in ED, and the need for timely follow up as directed.   They were provided with a work/school excuse if requested.  Pt given referral to ortho for follow up if s/s do not improve.  She is ambulatory with steady gait    Orders and Diagnoses  Diagnoses and all orders for this visit:  Left foot pain  -     XR foot left 3+ views; Future  -     methylPREDNISolone (Medrol Dospak) 4 mg tablets; Take as directed on package.  -     ibuprofen 600 mg tablet; Take 1 tablet (600 mg) by mouth every 6 hours if needed for mild pain (1 - 3) for up to 5 days.      Medical Admin Record      Patient disposition: Home    Electronically signed by PIA Liu  10:25 AM

## 2024-11-17 ASSESSMENT — ENCOUNTER SYMPTOMS
ENDOCRINE NEGATIVE: 1
EYES NEGATIVE: 1
MUSCULOSKELETAL NEGATIVE: 1
ALLERGIC/IMMUNOLOGIC NEGATIVE: 1
RESPIRATORY NEGATIVE: 1
PALPITATIONS: 0
HEMATOLOGIC/LYMPHATIC NEGATIVE: 1
CONSTITUTIONAL NEGATIVE: 1
PSYCHIATRIC NEGATIVE: 1
NEUROLOGICAL NEGATIVE: 1
GASTROINTESTINAL NEGATIVE: 1

## 2024-11-26 DIAGNOSIS — E66.01 CLASS 3 SEVERE OBESITY DUE TO EXCESS CALORIES WITHOUT SERIOUS COMORBIDITY WITH BODY MASS INDEX (BMI) OF 40.0 TO 44.9 IN ADULT: ICD-10-CM

## 2024-11-26 DIAGNOSIS — E66.813 CLASS 3 SEVERE OBESITY DUE TO EXCESS CALORIES WITHOUT SERIOUS COMORBIDITY WITH BODY MASS INDEX (BMI) OF 40.0 TO 44.9 IN ADULT: ICD-10-CM

## 2024-11-26 RX ORDER — NALTREXONE HYDROCHLORIDE AND BUPROPION HYDROCHLORIDE 8; 90 MG/1; MG/1
2 TABLET, EXTENDED RELEASE ORAL 2 TIMES DAILY
Qty: 120 TABLET | Refills: 0 | Status: SHIPPED | OUTPATIENT
Start: 2024-11-26

## 2025-01-29 ENCOUNTER — APPOINTMENT (OUTPATIENT)
Dept: PRIMARY CARE | Facility: CLINIC | Age: 30
End: 2025-01-29
Payer: COMMERCIAL

## 2025-01-29 VITALS
BODY MASS INDEX: 38.89 KG/M2 | WEIGHT: 242 LBS | DIASTOLIC BLOOD PRESSURE: 76 MMHG | RESPIRATION RATE: 18 BRPM | HEIGHT: 66 IN | HEART RATE: 64 BPM | SYSTOLIC BLOOD PRESSURE: 118 MMHG | TEMPERATURE: 97.3 F | OXYGEN SATURATION: 99 %

## 2025-01-29 DIAGNOSIS — E66.813 CLASS 3 SEVERE OBESITY DUE TO EXCESS CALORIES WITHOUT SERIOUS COMORBIDITY WITH BODY MASS INDEX (BMI) OF 40.0 TO 44.9 IN ADULT: ICD-10-CM

## 2025-01-29 DIAGNOSIS — E28.2 PCOS (POLYCYSTIC OVARIAN SYNDROME): Primary | ICD-10-CM

## 2025-01-29 DIAGNOSIS — Z23 NEED FOR COVID-19 VACCINE: ICD-10-CM

## 2025-01-29 DIAGNOSIS — E66.01 CLASS 3 SEVERE OBESITY DUE TO EXCESS CALORIES WITHOUT SERIOUS COMORBIDITY WITH BODY MASS INDEX (BMI) OF 40.0 TO 44.9 IN ADULT: ICD-10-CM

## 2025-01-29 LAB
ANION GAP SERPL CALCULATED.4IONS-SCNC: 8 MMOL/L (CALC) (ref 7–17)
BUN SERPL-MCNC: 20 MG/DL (ref 7–25)
BUN/CREAT SERPL: NORMAL (CALC) (ref 6–22)
CALCIUM SERPL-MCNC: 9.3 MG/DL (ref 8.6–10.2)
CHLORIDE SERPL-SCNC: 106 MMOL/L (ref 98–110)
CO2 SERPL-SCNC: 24 MMOL/L (ref 20–32)
CREAT SERPL-MCNC: 0.92 MG/DL (ref 0.5–0.96)
EGFRCR SERPLBLD CKD-EPI 2021: 88 ML/MIN/1.73M2
GLUCOSE SERPL-MCNC: 86 MG/DL (ref 65–139)
POTASSIUM SERPL-SCNC: 4.9 MMOL/L (ref 3.5–5.3)
SODIUM SERPL-SCNC: 138 MMOL/L (ref 135–146)

## 2025-01-29 PROCEDURE — 3008F BODY MASS INDEX DOCD: CPT | Performed by: PHYSICIAN ASSISTANT

## 2025-01-29 PROCEDURE — 90480 ADMN SARSCOV2 VAC 1/ONLY CMP: CPT | Performed by: PHYSICIAN ASSISTANT

## 2025-01-29 PROCEDURE — 99213 OFFICE O/P EST LOW 20 MIN: CPT | Performed by: PHYSICIAN ASSISTANT

## 2025-01-29 PROCEDURE — 91322 SARSCOV2 VAC 50 MCG/0.5ML IM: CPT | Performed by: PHYSICIAN ASSISTANT

## 2025-01-29 RX ORDER — ETONOGESTREL 68 MG/1
1 IMPLANT SUBCUTANEOUS ONCE
COMMUNITY

## 2025-01-29 RX ORDER — NALTREXONE HYDROCHLORIDE AND BUPROPION HYDROCHLORIDE 8; 90 MG/1; MG/1
2 TABLET, EXTENDED RELEASE ORAL 2 TIMES DAILY
Qty: 120 TABLET | Refills: 3 | Status: SHIPPED | OUTPATIENT
Start: 2025-01-29

## 2025-01-29 ASSESSMENT — ENCOUNTER SYMPTOMS: APPETITE CHANGE: 1

## 2025-01-29 NOTE — ASSESSMENT & PLAN NOTE
- Has noticed improvement on the spironolactone, wanting to increase dose  - Will check BMP first   - Ok to taper up to 100 mg BID if all looks good in the blood work

## 2025-01-29 NOTE — ASSESSMENT & PLAN NOTE
- Doing well on Contrave and would like to continue   - Encouraged continued healthy diet and regular exercise  - Rx sent   - Will monitor if still losing weight in 3-6 months. Encouraged renewed efforts at lifestyle modification in the meantime

## 2025-01-29 NOTE — PROGRESS NOTES
"Subjective   Patient ID: Fermin Clark is a 29 y.o. female who presents for Follow-up (Pt here today for a follow up for PCOS-Spironolactone working and weight management: Wegovy wasn't covered so gave Contrave and that seems to be working well with 2 in am and 2 in pm. ), Immunizations (Would like Covid injection ), and Med Refill (Contrave needs refilled to [facility]-pending).    HPI     PCOS  - Last visit with me 6 months ago was started on spironolactone to help with hirsutism   - Clinical course: improving, will continue with this     Obesity   - Doing good on Contrave   - Diet: trying to cut out sweets and sugars, avoid carbs when she can   - Exercise: was going for walks for a while but not when it's cold     Review of Systems   Constitutional:  Positive for appetite change.       Objective   /76   Pulse 64   Temp 36.3 °C (97.3 °F)   Resp 18   Ht 1.676 m (5' 6\")   Wt 110 kg (242 lb)   SpO2 99%   BMI 39.06 kg/m²     Physical Exam  Constitutional:       Appearance: She is obese.   Neurological:      Mental Status: She is alert.   Psychiatric:         Mood and Affect: Mood normal.         Behavior: Behavior normal.         Thought Content: Thought content normal.         Judgment: Judgment normal.         Assessment/Plan     Problem List Items Addressed This Visit       PCOS (polycystic ovarian syndrome) - Primary    Overview     - Dx'd 2016  - Tried OCP (ineffective)   - Current meds: Nexplanon (sees GYN), spironolactone          Current Assessment & Plan     - Has noticed improvement on the spironolactone, wanting to increase dose  - Will check BMP first   - Ok to taper up to 100 mg BID if all looks good in the blood work          Class 3 severe obesity due to excess calories without serious comorbidity with body mass index (BMI) of 40.0 to 44.9 in adult    Overview     - 8/6/24 - BMI = 42.13 kg/m2, Wt = 261 lbs. Start Contrave. Wegovy not covered   - 1/29/25 - BMI = 39.06 kg/m2, Wt = 242 lbs. Down " 18lbs on Contrave         Current Assessment & Plan     - Doing well on Contrave and would like to continue   - Encouraged continued healthy diet and regular exercise  - Rx sent   - Will monitor if still losing weight in 3-6 months. Encouraged renewed efforts at lifestyle modification in the meantime          Relevant Medications    naltrexone-bupropion (Contrave) 8-90 mg ER tablet     Other Visit Diagnoses       Need for COVID-19 vaccine        Relevant Orders    Moderna COVID-19 vaccine, monovalent, age 12 years and older, (50mcg/0.5mL)(Spikevax) (Completed)

## 2025-01-30 DIAGNOSIS — L68.0 FEMALE HIRSUTISM: ICD-10-CM

## 2025-01-30 DIAGNOSIS — E28.2 PCOS (POLYCYSTIC OVARIAN SYNDROME): ICD-10-CM

## 2025-01-30 RX ORDER — SPIRONOLACTONE 100 MG/1
100 TABLET, FILM COATED ORAL 2 TIMES DAILY
Qty: 180 TABLET | Refills: 3 | Status: SHIPPED | OUTPATIENT
Start: 2025-01-30 | End: 2026-01-25

## 2025-02-06 ENCOUNTER — APPOINTMENT (OUTPATIENT)
Dept: PRIMARY CARE | Facility: CLINIC | Age: 30
End: 2025-02-06
Payer: COMMERCIAL

## 2025-07-29 ENCOUNTER — APPOINTMENT (OUTPATIENT)
Dept: PRIMARY CARE | Facility: CLINIC | Age: 30
End: 2025-07-29
Payer: COMMERCIAL